# Patient Record
(demographics unavailable — no encounter records)

---

## 2025-06-10 NOTE — ADDENDUM
[FreeTextEntry1] : Patient's weighs 187 pounds with a BMI of 34.57. Patient has engaged in behavioral modification and dietary restriction for at least 3 months. Patient will benefit greatly using [employer].

## 2025-06-10 NOTE — PHYSICAL EXAM
[No Acute Distress] : no acute distress [Well Nourished] : well nourished [No Accessory Muscle Use] : no accessory muscle use [Clear to Auscultation] : lungs were clear to auscultation bilaterally [Regular Rhythm] : with a regular rhythm [Normal S1, S2] : normal S1 and S2 [No Murmur] : no murmur heard [No Edema] : there was no peripheral edema [Soft] : abdomen soft [Non Tender] : non-tender [Non-distended] : non-distended [No HSM] : no HSM [No Masses] : no abdominal mass palpated [Normal Affect] : the affect was normal [Normal Bowel Sounds] : normal bowel sounds [Normal Insight/Judgement] : insight and judgment were intact

## 2025-06-10 NOTE — HISTORY OF PRESENT ILLNESS
[FreeTextEntry1] :  obesity [de-identified] :  stopped zepbound 5mg ~12/24 - stated weight was 180 lbs  now weight is 192 lbs  prior to starting zepbound 192 lbs  diet is good  exercise: walking  unable to lose weight despite diet and exercise had lost weight with zepbound last year but unable to tolerate higher dose zepbound 5mg d/t nausea so self d/c'd zepbound